# Patient Record
Sex: FEMALE | Race: WHITE | URBAN - METROPOLITAN AREA
[De-identification: names, ages, dates, MRNs, and addresses within clinical notes are randomized per-mention and may not be internally consistent; named-entity substitution may affect disease eponyms.]

---

## 2020-10-08 ENCOUNTER — OFFICE VISIT (OUTPATIENT)
Dept: OBGYN CLINIC | Age: 40
End: 2020-10-08
Payer: MEDICARE

## 2020-10-08 ENCOUNTER — HOSPITAL ENCOUNTER (OUTPATIENT)
Age: 40
Setting detail: SPECIMEN
Discharge: HOME | End: 2020-10-08
Payer: MEDICARE

## 2020-10-08 VITALS
BODY MASS INDEX: 35.03 KG/M2 | SYSTOLIC BLOOD PRESSURE: 124 MMHG | DIASTOLIC BLOOD PRESSURE: 78 MMHG | HEIGHT: 67 IN | TEMPERATURE: 98.2 F | WEIGHT: 223.2 LBS

## 2020-10-08 PROBLEM — Z97.5 IUD (INTRAUTERINE DEVICE) IN PLACE: Status: ACTIVE | Noted: 2020-10-08

## 2020-10-08 PROCEDURE — 99386 PREV VISIT NEW AGE 40-64: CPT | Performed by: STUDENT IN AN ORGANIZED HEALTH CARE EDUCATION/TRAINING PROGRAM

## 2020-10-08 PROCEDURE — 58301 REMOVE INTRAUTERINE DEVICE: CPT | Performed by: STUDENT IN AN ORGANIZED HEALTH CARE EDUCATION/TRAINING PROGRAM

## 2020-10-08 PROCEDURE — 58300 INSERT INTRAUTERINE DEVICE: CPT | Performed by: STUDENT IN AN ORGANIZED HEALTH CARE EDUCATION/TRAINING PROGRAM

## 2020-10-08 RX ORDER — VENLAFAXINE HYDROCHLORIDE 75 MG/1
75 CAPSULE, EXTENDED RELEASE ORAL DAILY
COMMUNITY
End: 2021-11-02

## 2020-10-08 RX ORDER — LORATADINE 10 MG/1
10 CAPSULE, LIQUID FILLED ORAL DAILY
COMMUNITY

## 2020-10-08 NOTE — Clinical Note
Dillon Pretty,    I did an annual with IUD removal and re-insertion in the same appointment. Just wanted to make sure I coded that correctly.     Thanks,    Fransisca Eden [facility]   10/8/2020, 2:43 PM

## 2020-10-08 NOTE — PROGRESS NOTES
[facility]   [address]  [address]      Montserrat Anderson  10/8/2020                       Primary Care Physician: Elfego Cameron    CC:   Chief Complaint   Patient presents with    New Patient    Annual Exam     last pap 16 ASCUS,colpo 10-3-16 inflammed squamos     Contraception     IUD replaced         HPI: Montserrat Anderson is a 36 y.o. female  No LMP recorded. Patient has had an implant. The patient was seen and examined. She is here for an annual visit. She is complaining of nothing. Her Mirena IUD is due out as it was placed over 7 years ago. Her partner has a vasectomy for contraception. The patient would like another Mirena placed at this time due to a history of heavy and painful periods. The patient does not have any bleeding while using her Mirena. Her bowel habits are regular. She denies any bloating. She denies dysuria. She denies urinary leaking. She denies vaginal discharge. She is sexually active with single partner, contraception - vasectomy. She uses vasectomy for contraception and is not desiring pregnancy.     Depression Screen: Symptoms of decreased mood absent  Symptoms of anhedonia absent  **If either question is answered in a  positive fashion then complete the PHQ9 Scoring Evaluation and make the appropriate referral**    REVIEW OF SYSTEMS:   Constitutional: negative fever, negative chills  HEENT: negative visual disturbances, negative headaches  Respiratory: negative dyspnea, negative cough  Cardiovascular: negative chest pain,  negative palpitations  Gastrointestinal: negative abdominal pain, negative RUQ pain, negative N/V, negative diarrhea, negative constipation  Genitourinary: negative dysuria, negative vaginal discharge  Dermatological: negative rash  Hematologic: negative bruising  Immunologic/Lymphatic: negative recent illness, negative recent sick contact  Musculoskeletal: negative back pain, negative myalgias, negative arthralgias  Neurological:  negative dizziness, negative weakness  Behavior/Psych: negative depression, negative anxiety      GYNECOLOGICAL HISTORY:  Age of Menarche: 15  Age of Menopause: N/A     STD History: no past history    Pap History: Last PAP was abnormal;  2016 - ASC-US; Atypical Squamous Cells of Undetermined Significance. Colposcopy History: reports    Permanent Sterilization: no  Reversible Birth Control: yes - Mirena  Hormone Replacement Exposure: no    OBSTETRICAL HISTORY:  OB History    Para Term  AB Living   2 2 2 0 0 2   SAB TAB Ectopic Molar Multiple Live Births   0 0 0 0 0 2      # Outcome Date GA Lbr Tian/2nd Weight Sex Delivery Anes PTL Lv   2 Term 04    M Vag-Spont   MARGI   1 Term 98    M Vag-Spont   MARGI       PAST MEDICAL HISTORY:   has a past medical history of Abnormal Pap smear of cervix. PAST SURGICAL HISTORY:   has a past surgical history that includes Tonsillectomy; Tonsillectomy and adenoidectomy; Cholecystectomy; Hemorrhoid surgery; Colposcopy (10/03/2006); [place] tooth extraction; and Carpal tunnel release (Bilateral). ALLERGIES:  has No Known Allergies. MEDICATIONS:  Prior to Admission medications    Medication Sig Start Date End Date Taking?  Authorizing Provider   venlafaxine (EFFEXOR XR) 75 MG extended release capsule Take 75 mg by mouth daily 37.5 x2   Yes Historical Provider, MD   Docusate Sodium (COLACE PO) Take by mouth   Yes Historical Provider, MD   loratadine (CLARITIN) 10 MG capsule Take 10 mg by mouth daily   Yes Historical Provider, MD   ibuprofen (ADVIL;MOTRIN) 800 MG tablet Take 1 tablet by mouth every 8 hours as needed for Pain 16  Yes Maday Holden MD       FAMILY HISTORY:  Family History of Breast, Ovarian, Colon or Uterine Cancer: No   family history includes Breast Cancer (age of onset: 80) in her paternal grandmother; Dementia in her paternal grandmother; No Known Problems in her father, maternal grandfather, maternal grandmother, and paternal grandfather; Other in her mother. SOCIAL HISTORY:   reports that she has never smoked. She does not have any smokeless tobacco history on file. She reports current alcohol use. She reports that she does not use drugs. HEALTH MAINTENANCE:  Immunization status: stated as up to date, no records available    [address] [place] [address]: N/A. Ordered today. Colonoscopy: N/A  Pap Smears: Last 7/16 + ASCUS with +  HPV. Neg colposcopy. Updated today. Family Planning: Mirena inserted today  DEXA: N/A    VITALS:  Vitals:    10/08/20 1409   BP: 124/78   Site: Right Upper Arm   Position: Sitting   Cuff Size: Large Adult   Temp: 98.2 °F (36.8 °C)   Weight: 223 lb 3.2 oz (101.2 kg)   Height: 5' 7.25\" (1.708 m)                                                                                                                                                                                                        PHYSICAL EXAM:   General Appearance: Appears healthy. Alert; in no acute distress. Pleasant. Skin: Skin color, texture, turgor normal. No rashes or lesions. HEENT: normocephalic and atraumatic, Thyroid normal to inspection and palpation  Respiratory: Normal expansion. Clear to auscultation. No rales, rhonchi, or wheezing. Cardiovascular: normal rate, normal S1 and S2, no gallops, intact distal pulses and no carotid bruits  Breast:  (Chest): normal appearance, no masses or tenderness  Abdomen: soft, non-tender, non-distended, no right upper quadrant tenderness and no CVA tenderness  Pelvic Exam:   External genitalia: General appearance; normal, Hair distribution; normal, Lesions absent  Urinary system: urethral meatus normal  Vaginal: normal mucosa, no discharge  Cervix: normal appearing cervix without discharge or lesions, IUD strings not seen. Adnexa: non-palpable  Uterus: normal single, nontender    Procedure Details: The patient was counseled on the procedure. Risks, benefits and alternatives were reviewed. The patient is aware that this is diagnostic and not curative and a second procedure may be needed. A consent was reviewed and obtained. The patient was positioned comfortably on the exam table. After a bi-manual exam; the uterus was found to be anteverted. There was no cervical motion tenderness or adnexal masses and the bladder was smooth, non-tender and without palpable masses. A sterile speculum was placed without incident and the string was identified at the cervical portio. The site was then cleansed with Betadine and the strings were teased out of the cervix and grasped with a forceps and the IUD was removed without incident. The IUD was not sent to pathology. The Mirena IUD was opened and loaded into the delivery system. An Allis clamp was placed for stabilization. The wand was inserted just past the internal portio and the button was retracted to the first line. The wand was held in place for 10 seconds and then the button was retracted to its final position while the IUD was moved to the fundus, measuring 6 cm. The string was trimmed in standard fashion and the Allis clamp was removed. The speculum was then removed. The patient tolerated the procedure without difficulty. Rectal Exam: exam declined by patient  Musculoskeletal: no gross abnormalities  Extremities: non-tender BLE and non-edematous  Psych:  oriented to time, place and person     DATA:  No results found for this visit on 10/08/20. ASSESSMENT & PLAN:    Deb Gentile is a 36 y.o. female  No LMP recorded. Patient has had an implant. Annual:   Mammogram: N/A. Ordered today. Colonoscopy: N/A   Pap Smears: Last  + ASCUS with +  HPV. Neg colposcopy. Updated today.    Family Planning: Mirena inserted today   DEXA: N/A    Mirena IUD removed and re-inserted today    Patient Active Problem List    Diagnosis Date Noted   Anyi Tijerina Mirena 10/8/20 10/08/2020     Due out 10/8/25 Return in about 6 weeks (around 11/19/2020) for IUD string check. ROUTINE GYN HEALTH MAINTENANCE  Mammogram: N/A. Ordered today. Colonoscopy: N/A  Pap Smears: Last 7/16 + ASCUS with +  HPV. Neg colposcopy. Updated today. Family Planning: Mirena inserted today  DEXA: N/A      Counseling Completed:    Discussed need for repeat pap as per American Society for Colposcopy and Cervical Pathology guidelines. Discussed need for mammograms every 1 year, If >42 yo and last mammogram was negative. Discussed Calcium and Vitamin D dosing. Discussed need for colonoscopy screening as well as onset for bone density testing. Discussed birth control and barrier recommendations. Discussed STD counseling and prevention. Discussed Gardisil counseling for all patients 10-35 yo. Hereditary Breast, Ovarian, Colon and Uterine Cancer screening discussed. Tobacco & Secondary smoke risks discussed; with recommendation for cessation and avoidance. Routine health maintenance per patients PCP discussed. Diagnosis Orders   1. Women's annual routine gynecological examination  PAP SMEAR    C.trachomatis N.gonorrhoeae DNA   2. Encounter for screening mammogram for breast cancer  OLEG DIGITAL SCREEN W OR WO CAD BILATERAL   3. Hx of abnormal cervical Pap smear  PAP SMEAR   4. Encounter for removal and reinsertion of intrauterine contraceptive device (IUD)  ME REMOVE INTRAUTERINE DEVICE    ME INSERT INTRAUTERINE DEVICE    levonorgestrel (MIRENA) IUD 52 mg 1 each    C.trachomatis N.gonorrhoeae DNA   5.  Mirena 10/8/20          Roseanne Jeffrey DO  [facility], [facility]  10/8/2020, 2:49 PM

## 2020-10-10 LAB
C TRACH DNA GENITAL QL NAA+PROBE: NEGATIVE
N. GONORRHOEAE DNA: NEGATIVE
SPECIMEN DESCRIPTION: NORMAL

## 2020-10-15 LAB
HPV SOURCE: NORMAL
HPV, GENOTYPE 16: NOT DETECTED
HPV, GENOTYPE 18: NOT DETECTED
HPV, HIGH RISK OTHER: NOT DETECTED

## 2020-10-16 LAB — CYTOLOGY REPORT: NORMAL

## 2021-11-02 ENCOUNTER — OFFICE VISIT (OUTPATIENT)
Dept: OBGYN CLINIC | Age: 41
End: 2021-11-02
Payer: MEDICARE

## 2021-11-02 VITALS
WEIGHT: 236 LBS | DIASTOLIC BLOOD PRESSURE: 86 MMHG | HEART RATE: 78 BPM | HEIGHT: 68 IN | SYSTOLIC BLOOD PRESSURE: 128 MMHG | BODY MASS INDEX: 35.77 KG/M2

## 2021-11-02 DIAGNOSIS — Z01.419 WELL WOMAN EXAM: Primary | ICD-10-CM

## 2021-11-02 DIAGNOSIS — Z12.31 SCREENING MAMMOGRAM FOR BREAST CANCER: ICD-10-CM

## 2021-11-02 DIAGNOSIS — Z30.431 IUD CHECK UP: ICD-10-CM

## 2021-11-02 DIAGNOSIS — N92.0 MENORRHAGIA WITH REGULAR CYCLE: ICD-10-CM

## 2021-11-02 PROCEDURE — 99396 PREV VISIT EST AGE 40-64: CPT | Performed by: CLINICAL NURSE SPECIALIST

## 2021-11-02 PROCEDURE — G8484 FLU IMMUNIZE NO ADMIN: HCPCS | Performed by: CLINICAL NURSE SPECIALIST

## 2021-11-02 RX ORDER — IBUPROFEN 800 MG/1
TABLET ORAL
Qty: 60 TABLET | Refills: 1 | Status: SHIPPED | OUTPATIENT
Start: 2021-11-02

## 2021-11-02 RX ORDER — PAROXETINE HYDROCHLORIDE 40 MG/1
40 TABLET, FILM COATED ORAL DAILY
COMMUNITY
Start: 2021-10-05

## 2021-11-02 NOTE — PROGRESS NOTES
[facility]  [facility] Kristie Olvin  [facility]  Dept: [phone]        DATE OF VISIT:  21        History and Physical    Bernadette White    :  1980  CHIEF COMPLAINT:    Chief Complaint   Patient presents with    Gynecologic Exam                    Bernadette White is a 39 y.o. female who presents for annual well woman exam.  Patient reports that since she had this mirena placed 1 year ago she has had heavy bleeding monthly. She states that she uses a tampon and a panty liner and will saturated within  A few hours. With her previous IUD she did not have any menses. The patient was seen and examined. Per the patient bowels areregular. She has no voiding complaints. She denies any bloating as well as vaginal discharge. Chaperone for Intimate Exam   Chaperone was offered as part of the rooming process. Patient declined and agrees to continue with exam without a chaperone.    Chaperone: none     _____________________________________________________________________  Past Medical History:   Diagnosis Date    Abnormal Pap smear of cervix 2016    ASCUS                                                                   Past Surgical History:   Procedure Laterality Date    CARPAL TUNNEL RELEASE Bilateral     wrist     CHOLECYSTECTOMY      COLPOSCOPY  10/03/2006    inflammed squamos, no dysplasia    HEMORRHOID SURGERY      TONSILLECTOMY      TONSILLECTOMY AND ADENOIDECTOMY      WISDOM TOOTH EXTRACTION       Family History   Problem Relation Age of Onset    Other Mother         hysterectomy    No Known Problems Father     No Known Problems Maternal Grandmother     No Known Problems Maternal Grandfather     Breast Cancer Paternal Grandmother 80    Dementia Paternal Grandmother     No Known Problems Paternal Grandfather      Social History     Tobacco Use   Smoking Status Never Smoker     Social History     Substance and Sexual Activity Alcohol Use Yes    Comment: Occ     Current Outpatient Medications   Medication Sig Dispense Refill    PARoxetine (PAXIL) 40 MG tablet Take 40 mg by mouth daily      ibuprofen (ADVIL;MOTRIN) 800 MG tablet Take one pill at start of menses and repeat 1 tablet every 8 hours for 4 days. 60 tablet 1    Docusate Sodium (COLACE PO) Take by mouth      loratadine (CLARITIN) 10 MG capsule Take 10 mg by mouth daily      ibuprofen (ADVIL;MOTRIN) 800 MG tablet Take 1 tablet by mouth every 8 hours as needed for Pain 30 tablet 0     Current Facility-Administered Medications   Medication Dose Route Frequency Provider Last Rate Last Admin    levonorgestrel (MIRENA) IUD 52 mg 1 each  1 each IntraUTERine Continuous Sara Dillon, DO   1 each at 10/08/20 1513       Allergies:  No Known Allergies    Gynecologic History:  Patient's last menstrual period was 10/30/2021 (approximate).   Sexually Active: Yes  STD History:No  Birth Control: Yes IUD mirena    OB History    Para Term  AB Living   2 2 2 0 0 2   SAB TAB Ectopic Molar Multiple Live Births   0 0 0 0 0 2     ______________________________________________________________________    Review of Systems    REVIEW OFSYSTEMS:        Constitutional:  Unexpected weight change, extreme fatigue, night sweats              no  Skin:                           Rashes, moles   no  Neurological:  Frequentheadaches 5 times out of the week and sees neurology, seizures         yes  Ophthalmic:  Recent visual changes no  ENT:   Difficulty swallowing  no  Breast:              Masses, pain, nipple discharge                            no     Respiratory:  Shortness of breath, coughing           no    Cardiovascular: Chest pain   no     Gastrointestinal: Chronic diarrhea/constipation, nausea/vomiting           no   Urogenital:  Urinary incontinence, frequency, urgency          no                                         Heavy despite having mirena IUD/irregular periods           yes Vaginal discharge                   no  Hematological: Bruises easy   no     Endocrine:  Hot flashes   no     Hot/Cold Intolerance  no    Psychological:            Mood and affect were within normal limits. Mood swings and meds are not working well  yes                 Physical Exam    Physical Exam:    Vitals:    11/02/21 1407   BP: 128/86   Pulse: 78   Weight: 236 lb (107 kg)   Height: 5' 8\" (1.727 m)       General Appearance: This  is a well developed, well nourished, well groomed female. Her BMI was reviewed. Nutritional decision making andexercise were discussed. Neurological:  The patient is alert and oriented to time,place, person, and situation    Skin:  A brief inspection of the skin revealed no rashes or lesions. Neck:  The neck was supple. Respiratory: There was unlabored respiratory effort. Lungs clear to ascultation. Cardiovascular: The patients extremities were without calf tenderness or edema. Heart with a regular rate and rhythm. Abdomen: The abdomen was soft and non-tender with no guarding, rebound or rigidity. No hernias were appreciated. Breast:   The patients breasts were symmetrical.  There were no masses, discharge or retractions noted. Self breast exams were reviewed. Pelvic Exam:  The external genitalia was with a normal appearance. The vaginal vault was normal. There were no cystocele, rectocele, or enterocele appreciated. There was no vaginal discharge. The cervix was without lesions. There was no cervical motion tenderness. IUD strings noted. The uterus was mobile, midline and regular. The adnexa no fullness, tenderness or masses appreciated. ASSESSMENT:     Normal annual well woman exam    39 y.o. Female; Annual   Diagnosis Orders   1. Well woman exam     2. Screening mammogram for breast cancer  OLEG DIGITAL SCREEN W OR WO CAD BILATERAL   3. IUD check up     4.  Menorrhagia with regular cycle  ibuprofen (ADVIL;MOTRIN) 800 MG tablet                     PLAN:  - Pap collected. Discussed new papsmear guidelines. - Birth control Discussed. Discussed with patient that she could continue to have menses with mirena IUD. Discussed with patient using motrin 800 mg every 8 hr to help decrease menstrual bleeding  - Smoking risk factors Discussed  - Diet and exercise reviewed. - Routine healthmaintenance per patients PCP.  - Return to clinic in 1 year or earlier with questions, problems, concerns. Return for 1 year for Annual and as needed.         Electronically signed by DALE Milian CNP on 11/2/2021 at 2:43 PM

## 2022-11-09 ENCOUNTER — HOSPITAL ENCOUNTER (EMERGENCY)
Facility: CLINIC | Age: 42
Discharge: HOME | End: 2022-11-09
Attending: EMERGENCY MEDICINE
Payer: MEDICARE

## 2022-11-09 VITALS
DIASTOLIC BLOOD PRESSURE: 78 MMHG | OXYGEN SATURATION: 98 % | HEART RATE: 77 BPM | WEIGHT: 240 LBS | BODY MASS INDEX: 36.37 KG/M2 | RESPIRATION RATE: 18 BRPM | SYSTOLIC BLOOD PRESSURE: 143 MMHG | TEMPERATURE: 97.6 F | HEIGHT: 68 IN

## 2022-11-09 DIAGNOSIS — L03.312 CELLULITIS OF BACK EXCEPT BUTTOCK: Primary | ICD-10-CM

## 2022-11-09 PROCEDURE — 99283 EMERGENCY DEPT VISIT LOW MDM: CPT

## 2022-11-09 RX ORDER — CEPHALEXIN 500 MG/1
500 CAPSULE ORAL 4 TIMES DAILY
Qty: 28 CAPSULE | Refills: 0 | Status: SHIPPED | OUTPATIENT
Start: 2022-11-09 | End: 2022-11-16

## 2022-11-09 ASSESSMENT — ENCOUNTER SYMPTOMS
COLOR CHANGE: 1
BACK PAIN: 0

## 2022-11-09 ASSESSMENT — PAIN SCALES - GENERAL: PAINLEVEL_OUTOF10: 0

## 2022-11-09 ASSESSMENT — PAIN - FUNCTIONAL ASSESSMENT: PAIN_FUNCTIONAL_ASSESSMENT: 0-10

## 2022-11-09 NOTE — ED TRIAGE NOTES
Patient to emergency department with complaints of a mole on her back right shoulder which has been red and irritated, reports she first noticed it last week

## 2022-11-09 NOTE — ED PROVIDER NOTES
I was present in the ED during this patient's evaluation and management by the Advance Practice Provider and was available to address any concerns about their medical management.     Vibha Jackson MD  Attending, Emergency Department       Amadou New MD  11/09/22 8970

## 2022-11-09 NOTE — DISCHARGE INSTRUCTIONS
Please understand that at this time there is no evidence for a more serious underlying process, but that early in the process of an illness or injury, an emergency department workup can be falsely reassuring. You should contact your family doctor within the next 48 hours for a follow up appointment    Heather Russell!!!    From [facility] and [facility]    On behalf of the Emergency Department staff at [facility]), I would like to thank you for giving us the opportunity to address your health care needs and concerns. We hope that during your visit, our service was delivered in a professional and caring manner. Please keep [facility] in mind as we walk with you down the path to your own personal wellness. Please expect an automated text message or email from us so we can ask a few questions about your health and progress. Based on your answers, a clinician may call you back to offer help and instructions. Please understand that early in the process of an illness or injury, an emergency department workup can be falsely reassuring. If you notice any worsening, changing or persistent symptoms please call your family doctor or return to the ER immediately. Tell us how we did during your visit at [email or web address]   and let us know about your experience

## 2022-11-09 NOTE — ED PROVIDER NOTES
[facility]  [facility]  Phone: [phone]        Pt Name: Yumiko Leung  MRN: [id number]  [place] 1980  Date of evaluation: 22    CHIEFCOMPLAINT       Chief Complaint   Patient presents with    Skin Problem       HISTORY OF PRESENT ILLNESS (Location/Symptom, Timing/Onset, Context/Setting, Quality, Duration, Modifying Factors, Severity)      Yumiko Leung is a 43 y.o. female with no pertinent PMH who presents to the ED via private auto with red and irritated mole to her right side of her back. Patient states she has had the mole for as long as she can remember and over the last week he has become red and irritated. She denies any fevers or chills, shortness of breath, chest pain or difficulty breathing. She denies any bleeding, drainage, discharge from the mall but does report some irritation. She has not placed any medications topically. She attempted to be seen at her dermatologist and was told she cannot be seen until January. PAST MEDICAL / SURGICAL / SOCIAL / FAMILY HISTORY     PMH:  has a past medical history of Abnormal Pap smear of cervix. Surgical History:  has a past surgical history that includes Tonsillectomy; Tonsillectomy and adenoidectomy; Cholecystectomy; Hemorrhoid surgery; Colposcopy (10/03/2006); [place] tooth extraction; and Carpal tunnel release (Bilateral). Social History:  reports that she has never smoked. She does not have any smokeless tobacco history on file. She reports current alcohol use. She reports that she does not use drugs. Family History: She indicated that her mother is alive. She indicated that her father is alive. She indicated that her maternal grandmother is alive. She indicated that her maternal grandfather is . She indicated that her paternal grandmother is alive. She indicated that her paternal grandfather is .    family history includes Breast Cancer (age of onset: 80) in her paternal grandmother; Dementia in her paternal grandmother; No Known Problems in her father, maternal grandfather, maternal grandmother, and paternal grandfather; Other in her mother. Psychiatric History: None    Allergies: Patient has no known allergies. Home Medications:   Prior to Admission medications    Medication Sig Start Date End Date Taking? Authorizing Provider   cephALEXin (KEFLEX) 500 MG capsule Take 1 capsule by mouth 4 times daily for 7 days 11/9/22 11/16/22 Yes Marchjorge DALE Charlton CNP   PARoxetine (PAXIL) 40 MG tablet Take 40 mg by mouth daily 10/5/21   Historical Provider, MD   ibuprofen (ADVIL;MOTRIN) 800 MG tablet Take one pill at start of menses and repeat 1 tablet every 8 hours for 4 days. 11/2/21   DALE Wilcox CNP   Docusate Sodium (COLACE PO) Take by mouth    Historical Provider, MD   loratadine (CLARITIN) 10 MG capsule Take 10 mg by mouth daily    Historical Provider, MD   ibuprofen (ADVIL;MOTRIN) 800 MG tablet Take 1 tablet by mouth every 8 hours as needed for Pain 2/22/16   Nathalia Munguia MD       REVIEW OF SYSTEMS  (2-9 systems for level 4, 10 ormore for level 5)      Review of Systems   Constitutional:  Negative for chills and fever. Musculoskeletal:  Negative for back pain and neck pain. Skin:  Positive for color change and wound. Positive skin mole       All other systems negative except as marked. PHYSICAL EXAM  (up to 7 for level 4, 8 or more for level 5)      INITIAL VITALS:  height is 5' 8\" (1.727 m) and weight is 108.9 kg (240 lb). Her temperature is 97.6 °F (36.4 °C). Her blood pressure is 143/78 (abnormal) and her pulse is 77. Her respiration is 18 and oxygen saturation is 98%. Vital signs reviewed. Physical Exam  Constitutional:       General: She is not in acute distress. Appearance: Normal appearance. She is not ill-appearing or toxic-appearing. HENT:      Head: Normocephalic and atraumatic.    Musculoskeletal:      Cervical back: Neck supple. No rigidity. Skin:     General: Skin is warm and dry. Neurological:      Mental Status: She is alert. Psychiatric:         Mood and Affect: Mood normal.         Behavior: Behavior normal.         DIFFERENTIAL DIAGNOSIS / MDM     After my physical exam, I do believe the patient is developing some cellulitis to the area around her mole. Patient states she came to the ER in hopes that we would be able to remove the mole, I did discuss with her that we do not do that procedure in the emergency department. I did recommend patient follow-up with her dermatologist and PCP for possible removal of mole. We will start patient on oral antibiotics to cover for developing cellulitis. Instructed patient to apply topical triple antibiotic ointment and covered with bandage. Instructed to return with any worsening pain, swelling, drainage or other signs of infection. Patient was agreement to this plan at this time. All question concerns were answered at this time. At this time the patient is without objective evidence of an acute process requiring hospitalization or inpatient management. They have remained hemodynamically stable throughout their entire ED visit and are stable for discharge with outpatient follow-up. The patient understands that at this time there is no evidence for a more malignant underlying process, but the patient also understands that early in the process of an illness or injury, an emergency department workup can be falsely reassuring. Routine discharge counseling was given, and the patient understands that worsening, changing or persistent symptoms should prompt an immediate call or follow up with their primary physician or return to the emergency department. The importance of appropriate follow up was also discussed. I have reviewed the disposition diagnosis with the patient and or their family/guardian.   I have answered their questions and given discharge instructions. They voiced understanding of these instructions and did not have any further questions or complaints. PLAN (LABS / IMAGING / EKG):  No orders of the defined types were placed in this encounter. MEDICATIONS ORDERED:  Orders Placed This Encounter   Medications    cephALEXin (KEFLEX) 500 MG capsule     Sig: Take 1 capsule by mouth 4 times daily for 7 days     Dispense:  28 capsule     Refill:  0         Controlled Substances Monitoring:     DIAGNOSTIC RESULTS     EKG: All EKG's are interpreted by the Emergency Department Physician who either signs or Co-signs this chart in the absenceof a cardiologist.        RADIOLOGY: All images are read by the radiologist and their interpretations are reviewed. No orders to display       No results found. LABS:  No results found for this visit on 11/09/22. EMERGENCY DEPARTMENT COURSE           Vitals:    Vitals:    11/09/22 1248   BP: (!) 143/78   Pulse: 77   Resp: 18   Temp: 97.6 °F (36.4 °C)   SpO2: 98%   Weight: 108.9 kg (240 lb)   Height: 5' 8\" (1.727 m)     -------------------------  BP: (!) 143/78, Temp: 97.6 °F (36.4 °C), Heart Rate: 77, Resp: 18      RE-EVALUATION:  See ED Course notes above. CONSULTS:  None    PROCEDURES:  None    FINAL IMPRESSION      1. Cellulitis of back except buttock          DISPOSITION / PLAN     CONDITION ON DISPOSITION:   Stable for discharge.      PATIENT REFERRED TO:  [address] [place] [address], [address] [facility] [id number]    Call in 1 day      [facility]  / [facility] 66 [phone]    If symptoms worsen    DISCHARGE MEDICATIONS:  Discharge Medication List as of 11/9/2022  1:03 PM        START taking these medications    Details   cephALEXin (KEFLEX) 500 MG capsule Take 1 capsule by mouth 4 times daily for 7 days, Disp-28 capsule, R-0Normal             Pj Dorado APRN - LIZABETH   Emergency Medicine Nurse Practitioner    (Please note that portions of this note were completed with a voice recognition program.  Efforts were made to edit the dictations but occasionally words aremis-transcribed.)       DALE Vega - LIZABETH  11/09/22 1670

## 2023-05-16 ENCOUNTER — HOSPITAL ENCOUNTER (OUTPATIENT)
Age: 43
Setting detail: SPECIMEN
Discharge: HOME | End: 2023-05-16

## 2023-05-16 ENCOUNTER — OFFICE VISIT (OUTPATIENT)
Dept: OBGYN CLINIC | Age: 43
End: 2023-05-16
Payer: MEDICAID

## 2023-05-16 VITALS
SYSTOLIC BLOOD PRESSURE: 146 MMHG | BODY MASS INDEX: 38.61 KG/M2 | HEART RATE: 86 BPM | WEIGHT: 246 LBS | DIASTOLIC BLOOD PRESSURE: 90 MMHG | HEIGHT: 67 IN

## 2023-05-16 DIAGNOSIS — Z97.5 IUD (INTRAUTERINE DEVICE) IN PLACE: ICD-10-CM

## 2023-05-16 DIAGNOSIS — T83.32XA INTRAUTERINE CONTRACEPTIVE DEVICE THREADS LOST, INITIAL ENCOUNTER: ICD-10-CM

## 2023-05-16 DIAGNOSIS — Z01.419 ENCOUNTER FOR WELL WOMAN EXAM: Primary | ICD-10-CM

## 2023-05-16 DIAGNOSIS — R03.0 ELEVATED BP WITHOUT DIAGNOSIS OF HYPERTENSION: ICD-10-CM

## 2023-05-16 DIAGNOSIS — Z12.31 ENCOUNTER FOR SCREENING MAMMOGRAM FOR BREAST CANCER: ICD-10-CM

## 2023-05-16 PROBLEM — M79.7 FIBROMYALGIA: Status: ACTIVE | Noted: 2018-02-04

## 2023-05-16 PROBLEM — E66.9 OBESITY (BMI 30-39.9): Status: ACTIVE | Noted: 2018-08-13

## 2023-05-16 PROBLEM — R61 HYPERHIDROSIS: Status: ACTIVE | Noted: 2017-06-27

## 2023-05-16 PROBLEM — S39.012A LUMBAR STRAIN: Status: ACTIVE | Noted: 2018-02-02

## 2023-05-16 PROCEDURE — 99396 PREV VISIT EST AGE 40-64: CPT | Performed by: NURSE PRACTITIONER

## 2023-05-16 RX ORDER — FLUTICASONE PROPIONATE 50 MCG
SPRAY, SUSPENSION (ML) NASAL
COMMUNITY
Start: 2023-03-08

## 2023-05-16 RX ORDER — BUPROPION HYDROCHLORIDE 150 MG/1
150 TABLET ORAL EVERY MORNING
COMMUNITY
Start: 2023-05-02

## 2023-05-16 RX ORDER — VILAZODONE HYDROCHLORIDE 40 MG/1
TABLET ORAL
COMMUNITY
Start: 2023-05-15

## 2023-05-16 RX ORDER — ALPRAZOLAM 0.25 MG/1
TABLET ORAL
COMMUNITY
Start: 2023-03-06

## 2023-05-16 RX ORDER — ESCITALOPRAM OXALATE 5 MG/1
TABLET ORAL
COMMUNITY

## 2023-05-16 RX ORDER — LORATADINE 10 MG/1
10 TABLET ORAL DAILY
COMMUNITY
Start: 2023-04-16

## 2023-05-16 ASSESSMENT — ENCOUNTER SYMPTOMS
RESPIRATORY NEGATIVE: 1
BACK PAIN: 0
GASTROINTESTINAL NEGATIVE: 1
COUGH: 0
ALLERGIC/IMMUNOLOGIC NEGATIVE: 1
ABDOMINAL DISTENTION: 0
SHORTNESS OF BREATH: 0

## 2023-05-16 NOTE — PROGRESS NOTES
2023    Alessia Diehl (:  1980) is a 37 y.o. female, here for a preventive medicine evaluation. Patient Active Problem List   Diagnosis    Mirena 10/8/20    Anxiety disorder    Atopic rhinitis    Bilateral carpal tunnel syndrome    Chronic sinusitis    Constipation    Depression    Flushing    Gastroesophageal reflux disease    Fibromyalgia    Headache    Hemorrhoids    Hyperhidrosis    Lumbar strain    Menorrhagia    Migraine    Adiposity    Obesity (BMI 30-39. 9)    Rectal hemorrhage    Rectal pain    [facility] ([facility]       Review of Systems    Prior to Visit Medications    Medication Sig Taking?  Authorizing Provider   ALPRAZolam (XANAX) 0.25 MG tablet TAKE 1 TABLET BY MOUTH THREE TIMES A DAY AS NEEDED FOR ANXIETY  Historical Provider, MD   buPROPion (WELLBUTRIN XL) 150 MG extended release tablet Take 1 tablet by mouth every morning  Historical Provider, MD   escitalopram (LEXAPRO) 5 MG tablet 1 tablet Orally Once a day for 30 day(s)  Historical Provider, MD   fluticasone (FLONASE) 50 MCG/ACT nasal spray INSTILL 2 SPRAYS IN EACH NOSTRIL IN THE MORNING  Historical Provider, MD   loratadine (CLARITIN) 10 MG tablet Take 1 tablet by mouth daily  Historical Provider, MD   VILAZODONE HCL 40 MG Tablet   Historical Provider, MD   PARoxetine (PAXIL) 40 MG tablet Take 40 mg by mouth daily  Historical Provider, MD   Docusate Sodium (COLACE PO) Take by mouth  Historical Provider, MD   ibuprofen (ADVIL;MOTRIN) 800 MG tablet Take 1 tablet by mouth every 8 hours as needed for Pain  Estevan Tinajero MD        No Known Allergies    Past Medical History:   Diagnosis Date    Abnormal Pap smear of cervix 2016    ASCUS       Past Surgical History:   Procedure Laterality Date    CARPAL TUNNEL RELEASE Bilateral     wrist     CHOLECYSTECTOMY      COLPOSCOPY  10/03/2006    inflammed squamos, no dysplasia    HEMORRHOID SURGERY      TONSILLECTOMY      TONSILLECTOMY AND ADENOIDECTOMY
The patient is being seen for a physical exam.   The patient was asked if they would like a chaperone in the room during the exam..  The patient has respectfully declined
breast cancer  -     OLEG Screening Bilateral; Future  3. IUD (intrauterine device) in place  4. Intrauterine contraceptive device threads lost, initial encounter  5. Elevated BP without diagnosis of hypertension    - IUD strings not visualized. Patient is amenorrheic and not relying on it for birth control. Declines ultrasound at this time but will call if something changes    - follow up with pcp re: BP             Hereditary Breast, Ovarian, Colon and Uterine Cancer screening Done. Tobacco & Secondary smoke risks reviewed; instructed on cessation and avoidance    - Pap collected per ASCCP guidelines.  -Discussed menopausal symptoms, HRT, incontinence. - Screening mammogram discussed and advised yearly if normalstarting at age 36.  - Calcium and Vitamin D dosing reviewed. - Colonoscopy screening reviewed. - General diet and exercise reviewed. - Routine health maintenance per patients PCP.     Return in about 1 year (around 5/16/2024) for annual exam.        Electronically signed by DALE Borjas CNP on 5/16/23 at 9:54 AM EDT

## 2023-05-18 LAB
HPV SAMPLE: NORMAL
HPV, GENOTYPE 16: NOT DETECTED
HPV, GENOTYPE 18: NOT DETECTED
HPV, HIGH RISK OTHER: NOT DETECTED
HPV, INTERPRETATION: NORMAL
SPECIMEN DESCRIPTION: NORMAL

## 2023-05-19 LAB — CYTOLOGY REPORT: NORMAL
